# Patient Record
Sex: FEMALE | Race: WHITE | Employment: OTHER | URBAN - NONMETROPOLITAN AREA
[De-identification: names, ages, dates, MRNs, and addresses within clinical notes are randomized per-mention and may not be internally consistent; named-entity substitution may affect disease eponyms.]

---

## 2022-06-28 ENCOUNTER — HOSPITAL ENCOUNTER (EMERGENCY)
Age: 67
Discharge: HOME | End: 2022-06-28
Payer: MEDICARE

## 2022-06-28 ENCOUNTER — APPOINTMENT (OUTPATIENT)
Dept: CT IMAGING | Age: 67
End: 2022-06-28
Payer: MEDICARE

## 2022-06-28 VITALS
RESPIRATION RATE: 20 BRPM | SYSTOLIC BLOOD PRESSURE: 141 MMHG | TEMPERATURE: 98.9 F | WEIGHT: 150 LBS | HEIGHT: 63 IN | BODY MASS INDEX: 26.58 KG/M2 | OXYGEN SATURATION: 94 % | HEART RATE: 71 BPM | DIASTOLIC BLOOD PRESSURE: 51 MMHG

## 2022-06-28 DIAGNOSIS — U07.1 COVID-19: Primary | ICD-10-CM

## 2022-06-28 LAB
ALBUMIN SERPL-MCNC: 4 G/DL (ref 3.5–5.2)
ALLENS TEST: ABNORMAL
ALP BLD-CCNC: 192 U/L (ref 35–104)
ALT SERPL-CCNC: 49 U/L (ref 5–33)
ANION GAP SERPL CALCULATED.3IONS-SCNC: 13 MMOL/L (ref 7–19)
APTT: 29.9 SEC (ref 26–36.2)
AST SERPL-CCNC: 73 U/L (ref 5–32)
BASE EXCESS ARTERIAL: -0.1 MMOL/L (ref -2–2)
BASOPHILS ABSOLUTE: 0 K/UL (ref 0–0.2)
BASOPHILS RELATIVE PERCENT: 0.5 % (ref 0–1)
BILIRUB SERPL-MCNC: 0.6 MG/DL (ref 0.2–1.2)
BUN BLDV-MCNC: 11 MG/DL (ref 8–23)
CALCIUM SERPL-MCNC: 9.4 MG/DL (ref 8.8–10.2)
CARBOXYHEMOGLOBIN ARTERIAL: 1.2 % (ref 0–5)
CHLORIDE BLD-SCNC: 105 MMOL/L (ref 98–111)
CO2: 22 MMOL/L (ref 22–29)
CREAT SERPL-MCNC: 0.7 MG/DL (ref 0.5–0.9)
EKG P AXIS: 50 DEGREES
EKG P-R INTERVAL: 142 MS
EKG Q-T INTERVAL: 376 MS
EKG QRS DURATION: 86 MS
EKG QTC CALCULATION (BAZETT): 409 MS
EKG T AXIS: 23 DEGREES
EOSINOPHILS ABSOLUTE: 0.2 K/UL (ref 0–0.6)
EOSINOPHILS RELATIVE PERCENT: 2 % (ref 0–5)
FIO2: 21 %
GFR AFRICAN AMERICAN: >59
GFR NON-AFRICAN AMERICAN: >60
GLUCOSE BLD-MCNC: 122 MG/DL (ref 74–109)
HCO3 ARTERIAL: 21.8 MMOL/L (ref 22–26)
HCT VFR BLD CALC: 47.2 % (ref 37–47)
HEMOGLOBIN, ART, EXTENDED: 14.9 G/DL (ref 12–16)
HEMOGLOBIN: 15.4 G/DL (ref 12–16)
IMMATURE GRANULOCYTES #: 0 K/UL
INR BLD: 0.97 (ref 0.88–1.18)
LYMPHOCYTES ABSOLUTE: 1.2 K/UL (ref 1.1–4.5)
LYMPHOCYTES RELATIVE PERCENT: 16.5 % (ref 20–40)
MAGNESIUM: 2.2 MG/DL (ref 1.6–2.4)
MCH RBC QN AUTO: 28.4 PG (ref 27–31)
MCHC RBC AUTO-ENTMCNC: 32.6 G/DL (ref 33–37)
MCV RBC AUTO: 86.9 FL (ref 81–99)
METHEMOGLOBIN ARTERIAL: 0.9 %
MONOCYTES ABSOLUTE: 0.8 K/UL (ref 0–0.9)
MONOCYTES RELATIVE PERCENT: 10.4 % (ref 0–10)
NEUTROPHILS ABSOLUTE: 5.2 K/UL (ref 1.5–7.5)
NEUTROPHILS RELATIVE PERCENT: 70.1 % (ref 50–65)
O2 CONTENT ARTERIAL: 20.3 ML/DL
O2 SAT, ARTERIAL: 96.5 %
O2 THERAPY: ABNORMAL
PCO2 ARTERIAL: 28 MMHG (ref 35–45)
PDW BLD-RTO: 13.8 % (ref 11.5–14.5)
PH ARTERIAL: 7.5 (ref 7.35–7.45)
PLATELET # BLD: 186 K/UL (ref 130–400)
PMV BLD AUTO: 11.3 FL (ref 9.4–12.3)
PO2 ARTERIAL: 104 MMHG (ref 80–100)
POTASSIUM REFLEX MAGNESIUM: 3.5 MMOL/L (ref 3.5–5)
POTASSIUM, WHOLE BLOOD: 3.4
PROTHROMBIN TIME: 12.8 SEC (ref 12–14.6)
RBC # BLD: 5.43 M/UL (ref 4.2–5.4)
SAMPLE SOURCE: ABNORMAL
SODIUM BLD-SCNC: 140 MMOL/L (ref 136–145)
TOTAL PROTEIN: 7.5 G/DL (ref 6.6–8.7)
WBC # BLD: 7.4 K/UL (ref 4.8–10.8)

## 2022-06-28 PROCEDURE — 85025 COMPLETE CBC W/AUTO DIFF WBC: CPT

## 2022-06-28 PROCEDURE — 80053 COMPREHEN METABOLIC PANEL: CPT

## 2022-06-28 PROCEDURE — 85730 THROMBOPLASTIN TIME PARTIAL: CPT

## 2022-06-28 PROCEDURE — 99285 EMERGENCY DEPT VISIT HI MDM: CPT

## 2022-06-28 PROCEDURE — 36415 COLL VENOUS BLD VENIPUNCTURE: CPT

## 2022-06-28 PROCEDURE — 2580000003 HC RX 258: Performed by: PHYSICIAN ASSISTANT

## 2022-06-28 PROCEDURE — 83735 ASSAY OF MAGNESIUM: CPT

## 2022-06-28 PROCEDURE — 6360000004 HC RX CONTRAST MEDICATION: Performed by: PHYSICIAN ASSISTANT

## 2022-06-28 PROCEDURE — 36600 WITHDRAWAL OF ARTERIAL BLOOD: CPT

## 2022-06-28 PROCEDURE — 93010 ELECTROCARDIOGRAM REPORT: CPT | Performed by: INTERNAL MEDICINE

## 2022-06-28 PROCEDURE — 93005 ELECTROCARDIOGRAM TRACING: CPT | Performed by: PHYSICIAN ASSISTANT

## 2022-06-28 PROCEDURE — 71275 CT ANGIOGRAPHY CHEST: CPT | Performed by: RADIOLOGY

## 2022-06-28 PROCEDURE — 71275 CT ANGIOGRAPHY CHEST: CPT

## 2022-06-28 PROCEDURE — 85610 PROTHROMBIN TIME: CPT

## 2022-06-28 PROCEDURE — 82803 BLOOD GASES ANY COMBINATION: CPT

## 2022-06-28 RX ORDER — 0.9 % SODIUM CHLORIDE 0.9 %
1000 INTRAVENOUS SOLUTION INTRAVENOUS ONCE
Status: COMPLETED | OUTPATIENT
Start: 2022-06-28 | End: 2022-06-28

## 2022-06-28 RX ORDER — M-VIT,TX,IRON,MINS/CALC/FOLIC 27MG-0.4MG
1 TABLET ORAL DAILY
COMMUNITY

## 2022-06-28 RX ORDER — FLUTICASONE PROPIONATE 50 MCG
1 SPRAY, SUSPENSION (ML) NASAL DAILY
COMMUNITY

## 2022-06-28 RX ORDER — ALBUTEROL SULFATE 90 UG/1
2 AEROSOL, METERED RESPIRATORY (INHALATION) 4 TIMES DAILY PRN
Qty: 18 G | Refills: 0 | Status: SHIPPED | OUTPATIENT
Start: 2022-06-28

## 2022-06-28 RX ORDER — CETIRIZINE HYDROCHLORIDE 10 MG/1
10 TABLET ORAL DAILY
COMMUNITY

## 2022-06-28 RX ADMIN — SODIUM CHLORIDE 1000 ML: 9 INJECTION, SOLUTION INTRAVENOUS at 16:23

## 2022-06-28 RX ADMIN — IOPAMIDOL 65 ML: 755 INJECTION, SOLUTION INTRAVENOUS at 13:52

## 2022-06-28 ASSESSMENT — ENCOUNTER SYMPTOMS
PHOTOPHOBIA: 0
SORE THROAT: 0
EYE DISCHARGE: 0
COUGH: 1
COLOR CHANGE: 0
ABDOMINAL DISTENTION: 0
ABDOMINAL PAIN: 0
NAUSEA: 0
APNEA: 0
SHORTNESS OF BREATH: 1
BACK PAIN: 0
RHINORRHEA: 0
EYE PAIN: 0

## 2022-06-28 NOTE — CARE COORDINATION
SW visited with Pt re: being released from ED; she stated she has been staying at [facility] for a couple of weeks (while her spouse is working a golf scramble, etc.,); supposed to leave tomorrow to head home, but she tested positive for covid yesterday; and called EMS re: SOB and didn't feel safe to drive to hospital; lives in [place] ; she would go back to [facility], but they already had her room booked for the next guest on Thursday; spouse doesn't want to come  Brad Garciaer her up b/c he doesn't want covid and has to work; she doesn't feel safe to drive with her coughing, headache, and SOB; stated her health history includes, fibromyalgia, brain bleed, asthma, and bronchitis issues. She doesn't want to stay in [address] [place] [address] at a hotel as she is unfamiliar and would rather just go to her home. She stated she and spouse will come back to get her car at the [facility] later. Long conversations; pt very pleasant but talkative; she is agreeable to pay cab ride back to her home in [place] and her  can  any meds she needs;     SW placed calls to [facility] and [employer] ; also had to wait on call backs; [employer] said no; [employer]  will send someone in approx 40 min; [phone]; they will call ED when they arrive; 109 miles per google maps;  approx cost 450.00 with covid fee/cleaning and 1.00/mile back to town; Pt is agreeable. Uses [facility] or [employer] [facility] in [place].       Electronically signed by JEREMIAH Knox on 6/28/2022 at 7:04 PM

## 2022-06-28 NOTE — CARE COORDINATION
SW placed call to 2150 spoke with Cholo; he indicated that all he knows is Pt was staying in a hotel and they \"kicked her out\" because she has covid and has no where to go.     Electronically signed by JEREMIAH Nesbitt on 6/28/2022 at 4:46 PM

## 2022-06-28 NOTE — ED PROVIDER NOTES
[facility] EMERGENCY DEPT  eMERGENCYdEPARTMENT eNCOUnter      Pt Name: Yannick Chou  MRN: [id number]  [place] 1955  Date of evaluation: 6/28/2022  Provider:MALCOM Lizarraga    CHIEF COMPLAINT       Chief Complaint   Patient presents with    Shortness of Breath     Pt tested + for COVID yesterday had symptoms since Saturday. Pt C/O SOB. Pt in NAD at time of triage. HISTORY OF PRESENT ILLNESS  (Location/Symptom, Timing/Onset, Context/Setting, Quality, Duration, Modifying Factors, Severity.)   Yannick Chou is a 77 y.o. female who presents to the emergency department with complaints SOB dry cough sore throat covid symptoms on Saturday. Patient positive swab yesterday. Afebrile. Hx of fibro auto immune hx. She has blood clot had to be drained craniotomy. Denies wheezing. Body aches chills. Needs to urinate. HPI    Nursing Notes were reviewed and I agree. REVIEW OF SYSTEMS    (2-9 systems for level 4, 10 or more for level 5)     Review of Systems   Constitutional: Negative for activity change, appetite change, chills and fever. HENT: Negative for congestion, postnasal drip, rhinorrhea and sore throat. Eyes: Negative for photophobia, pain, discharge and visual disturbance. Respiratory: Positive for cough and shortness of breath. Negative for apnea. Cardiovascular: Negative for chest pain and leg swelling. Gastrointestinal: Negative for abdominal distention, abdominal pain and nausea. Genitourinary: Negative for vaginal bleeding. Musculoskeletal: Negative for arthralgias, back pain, joint swelling, neck pain and neck stiffness. Skin: Negative for color change and rash. Neurological: Negative for dizziness, syncope, facial asymmetry and headaches. Hematological: Negative for adenopathy. Does not bruise/bleed easily. Psychiatric/Behavioral: Negative for agitation, behavioral problems and confusion.         Except as noted above the remainder of the review of systems was Session: Not on file   Stress:     Feeling of Stress : Not on file   Social Connections:     Frequency of Communication with Friends and Family: Not on file    Frequency of Social Gatherings with Friends and Family: Not on file    Attends [employer] Services: Not on file    Active Member of [address] [place] [address] or Organizations: Not on file    Attends Club or Organization Meetings: Not on file    Marital Status: Not on file   Intimate Partner Violence:     Fear of Current or Ex-Partner: Not on file    Emotionally Abused: Not on file    Physically Abused: Not on file    Sexually Abused: Not on file   Housing Stability:     Unable to Pay for Housing in the Last Year: Not on file    Number of [place] in the Last Year: Not on file    Unstable Housing in the Last Year: Not on file       SCREENINGS    [place] Coma Scale  Eye Opening: Spontaneous  Best Verbal Response: Oriented  Best Motor Response: Obeys commands  [place] Coma Scale Score: 15      PHYSICAL EXAM    (up to 7 forlevel 4, 8 or more for level 5)     ED Triage Vitals [06/28/22 1117]   BP Temp Temp Source Heart Rate Resp SpO2 Height Weight   133/66 98.9 °F (37.2 °C) Oral 80 18 95 % 5' 3\" (1.6 m) 150 lb (68 kg)       Physical Exam  Vitals and nursing note reviewed. Constitutional:       General: She is not in acute distress. Appearance: She is well-developed. She is not diaphoretic. HENT:      Head: Normocephalic and atraumatic. Right Ear: External ear normal.      Left Ear: External ear normal.      Mouth/Throat:      Pharynx: No oropharyngeal exudate. Eyes:      General:         Right eye: No discharge. Left eye: No discharge. Pupils: Pupils are equal, round, and reactive to light. Neck:      Thyroid: No thyromegaly. Cardiovascular:      Rate and Rhythm: Normal rate and regular rhythm. Heart sounds: Normal heart sounds. No murmur heard. No friction rub.    Pulmonary:      Effort: Pulmonary effort is normal. No respiratory distress. Breath sounds: Normal breath sounds. No stridor. No wheezing. Abdominal:      General: Bowel sounds are normal. There is no distension. Palpations: Abdomen is soft. Tenderness: There is no abdominal tenderness. Musculoskeletal:         General: Normal range of motion. Cervical back: Normal range of motion and neck supple. Skin:     General: Skin is warm and dry. Capillary Refill: Capillary refill takes less than 2 seconds. Findings: No rash. Neurological:      Mental Status: She is alert and oriented to person, place, and time. Cranial Nerves: No cranial nerve deficit. Sensory: No sensory deficit. Coordination: Coordination normal.   Psychiatric:         Behavior: Behavior normal.         Thought Content: Thought content normal.           DIAGNOSTIC RESULTS     RADIOLOGY:   Non-plain film images such as CT, Ultrasound and MRI are read by the radiologist. Plain radiographic images are visualized and preliminarilyinterpreted by No att. providers found with the below findings:      Interpretation per the Radiologist below, if available at the time of this note:    CTA PULMONARY W CONTRAST   Final Result   1. Unremarkable CTA of the chest.   2.  No filling defect within the pulmonary arteries to the segmental branch level to suggest pulmonary embolism. 3.  A few incidental findings. Recommendation: Follow up as clinically indicated. All CT scans at this facility utilize dose modulation, iterative reconstruction, and/or weight based dosing when appropriate to reduce radiation dose to as low as reasonably achievable.     Electronically Signed by Eren Rodarte MD at 28-Jun-2022 04:09:17 PM                   LABS:  Labs Reviewed   CBC WITH AUTO DIFFERENTIAL - Abnormal; Notable for the following components:       Result Value    RBC 5.43 (*)     Hematocrit 47.2 (*)     MCHC 32.6 (*)     Neutrophils % 70.1 (*)     Lymphocytes % 16.5 (*)     Monocytes % 10.4 (*)     All other components within normal limits   COMPREHENSIVE METABOLIC PANEL W/ REFLEX TO MG FOR LOW K - Abnormal; Notable for the following components:    Glucose 122 (*)     Alkaline Phosphatase 192 (*)     ALT 49 (*)     AST 73 (*)     All other components within normal limits   BLOOD GAS, ARTERIAL - Abnormal; Notable for the following components:    pH, Arterial 7.500 (*)     pCO2, Arterial 28.0 (*)     pO2, Arterial 104.0 (*)     HCO3, Arterial 21.8 (*)     All other components within normal limits   PROTIME-INR   APTT   MAGNESIUM       All other labs were within normal range or notreturned as of this dictation. RE-ASSESSMENT        EMERGENCY DEPARTMENT COURSE and DIFFERENTIAL DIAGNOSIS/MDM:   Vitals:    Vitals:    06/28/22 1617 06/28/22 1631 06/28/22 1816 06/28/22 1820   BP: 125/65 132/72 (!) 141/51    Pulse: 87 77 73 71   Resp: 15 22 17 20   Temp:       TempSrc:       SpO2:  94% 90% 94%   Weight:       Height:         EKG normal sinus rhythm rate of 79 no ST changes no prior EKG to compare to. MDM  Patient is asymptomatic here has a mild headache we have given Toradol for that did not feel head CT indicated without this being the worst of her life feels much better with some fluids positive for COVID outpatient setting we did go ahead and rule out any blood clot with her history a CTA which was negative I confirmed with pharmacy based on risk factors and her medications that she would be an appropriate candidate for Paxilovid which she is interested in trying I have sent that to her pharmacy back home to  she is going to get a taxi to get herself home at this time.   PROCEDURES:    Procedures      FINAL IMPRESSION      1. COVID-19          DISPOSITION/PLAN   DISPOSITION Decision To Discharge 06/28/2022 06:12:11 PM      PATIENT REFERRED TO:  [facility] EMERGENCY DEPT  [facility]  [phone]    If symptoms worsen      DISCHARGE MEDICATIONS:  Discharge Medication